# Patient Record
Sex: FEMALE | Race: WHITE | Employment: OTHER | URBAN - METROPOLITAN AREA
[De-identification: names, ages, dates, MRNs, and addresses within clinical notes are randomized per-mention and may not be internally consistent; named-entity substitution may affect disease eponyms.]

---

## 2021-02-02 ENCOUNTER — TRANSCRIBE ORDER (OUTPATIENT)
Dept: SCHEDULING | Age: 66
End: 2021-02-02

## 2021-02-02 DIAGNOSIS — S09.90XA HEAD INJURY: Primary | ICD-10-CM

## 2021-02-03 ENCOUNTER — HOSPITAL ENCOUNTER (OUTPATIENT)
Dept: CT IMAGING | Age: 66
Discharge: HOME | End: 2021-02-03
Payer: MEDICARE

## 2021-02-03 DIAGNOSIS — S09.90XA HEAD INJURY: ICD-10-CM

## 2021-02-03 PROCEDURE — 70450 CT HEAD/BRAIN W/O DYE: CPT

## 2023-01-19 ENCOUNTER — OFFICE VISIT (OUTPATIENT)
Dept: ORTHOPEDIC SURGERY | Age: 68
End: 2023-01-19
Payer: MEDICARE

## 2023-01-19 VITALS — HEIGHT: 66 IN | WEIGHT: 138 LBS | BODY MASS INDEX: 22.18 KG/M2

## 2023-01-19 DIAGNOSIS — M70.61 TROCHANTERIC BURSITIS OF BOTH HIPS: ICD-10-CM

## 2023-01-19 DIAGNOSIS — M25.552 BILATERAL HIP PAIN: Primary | ICD-10-CM

## 2023-01-19 DIAGNOSIS — M25.551 BILATERAL HIP PAIN: Primary | ICD-10-CM

## 2023-01-19 DIAGNOSIS — M70.62 TROCHANTERIC BURSITIS OF BOTH HIPS: ICD-10-CM

## 2023-01-19 RX ORDER — MULTIVITAMIN
1 TABLET ORAL DAILY
COMMUNITY

## 2023-01-19 NOTE — PROGRESS NOTES
Amanda Desouza (: 1955) is a 69 y.o. female patient, here for evaluation of the following chief complaint(s):  Hip Pain (Bilateral hip pain/)       ASSESSMENT/PLAN:  Below is the assessment and plan developed based on review of pertinent history, physical exam, labs, studies, and medications. 61-year-old female comes in today complaining of bilateral hip pain. The pain is laterally based. She has no groin or thigh pain. Is been going on for more than 6 months. She has tried oral anti-inflammatories which helped somewhat. She has pain at night when she sleeps on both sides. She says she has mild to moderate pain going up and on stairs and walking. She has mild pain rising from sitting. She wanted to discuss further management options. We are going to start physical therapy. We also discussed intermittent over-the-counter anti-inflammatories including risks and benefits. She can follow-up as needed. If it worsens we may consider an injection      1. Bilateral hip pain  -     XR HIPS BI W OR WO AP PELV; Future  2. Trochanteric bursitis of both hips      Encounter Diagnoses   Name Primary? Bilateral hip pain Yes    Trochanteric bursitis of both hips         No follow-ups on file. SUBJECTIVE/OBJECTIVE:  Amanda Desouza (: 1955) is a 69 y.o. female who presents today for the following:  Chief Complaint   Patient presents with    Hip Pain     Bilateral hip pain         61-year-old female comes in today complaining of bilateral hip pain. The pain is laterally based. She has no groin or thigh pain. Is been going on for more than 6 months. She has tried oral anti-inflammatories which helped somewhat. She has pain at night when she sleeps on both sides. She says she has mild to moderate pain going up and on stairs and walking. She has mild pain rising from sitting. IMAGING:  XR Results (most recent):  No results found for this or any previous visit.        No Known Allergies    Current Outpatient Medications   Medication Sig    multivitamin (ONE A DAY) tablet Take 1 Tablet by mouth daily. No current facility-administered medications for this visit. No past medical history on file. No past surgical history on file. No family history on file. Social History     Tobacco Use    Smoking status: Never    Smokeless tobacco: Never   Substance Use Topics    Alcohol use: Not Currently        All systems reviewed x 12 and were negative with the exception of None      No flowsheet data found. Vitals:  Ht 5' 6\" (1.676 m)   Wt 138 lb (62.6 kg)   BMI 22.27 kg/m²    Body mass index is 22.27 kg/m². Physical Exam    General: NAD, well developed, well nourished. Cardiac: Extremities well perfused. Respiratory: Nonlabored breathing. RLE: No antalgic gait. Tender over trochanteric bursa. Negative stinchfield. 0-100 degrees of flexion. >20 degrees internal rotation, >30 degrees external rotation. Negative LINDA. No pain with flexion adduction and internal rotation. .  Motor strength grossly intact. LLE: No antalgic gait. Tender over trochanteric bursa. Negative stinchfield. 0-100 degrees of flexion. >20 degrees internal rotation, >30 degrees external rotation. Negative LINDA. No pain with flexion adduction and internal rotation. .  Motor strength grossly intact. Skin: Warm well perfused. Vascular: Palpable pedal pulses bilaterally. Equal. Capillary refill less than 2 seconds. An electronic signature was used to authenticate this note.   -- Faye Phillips MD

## 2023-02-01 ENCOUNTER — OFFICE VISIT (OUTPATIENT)
Dept: ORTHOPEDIC SURGERY | Age: 68
End: 2023-02-01
Payer: MEDICARE

## 2023-02-01 DIAGNOSIS — M70.61 TROCHANTERIC BURSITIS OF BOTH HIPS: ICD-10-CM

## 2023-02-01 DIAGNOSIS — M25.551 BILATERAL HIP PAIN: Primary | ICD-10-CM

## 2023-02-01 DIAGNOSIS — M70.62 TROCHANTERIC BURSITIS OF BOTH HIPS: ICD-10-CM

## 2023-02-01 DIAGNOSIS — M25.552 BILATERAL HIP PAIN: Primary | ICD-10-CM

## 2023-02-01 NOTE — PROGRESS NOTES
Donna Zhang (: 1955) is a 69 y.o. female patient here for evaluation of the following chief complaint(s):  Hip Pain       Patient Name: Donna Zhang  Date:2023  : 1955  [x]  Patient  Verified  Payor: Renenirmal Chilo / Plan: [address] [place] [address] HMO / Product Type: Managed Care Medicare /    In time: 9:55  Out time: 10:45  Total Treatment Time (min): 50  Total Timed Codes (min): 40  1:1 Treatment Time ([facility] only): 40   Visit #:       SUBJECTIVE/OBJECTIVE:  HPI    The patient is a 69year old female with bilateral hip pain. The patient is referred by Erasmo Dixon MD.   The pain is laterally based. She has no groin or thigh pain. It has been bothering her on and off for 6-7 years but she has not done anything to address it. She has tried oral anti-inflammatories which helped somewhat. She has pain at night when she sleeps on both sides. She says she has mild to moderate pain with prolonged standing, walking and has mild pain rising from sitting. Long-term she would like to get back to distance walking, being able to sleep on either side, and developing a strength and flexibility routine to help her symptoms. She does have a home gym set up where she gets on her rowing machine, does some yoga and lifts free weights.     Physical Exam    Hip physical exam    Range of motion: on right  Flexion-  full  Extension-  not tested  IR/ER- measured in sitting; 45°/40°    Range of motion: on left  Flexion- full  Extension- not tested   IR/ER- measured in sitting; 45°/45°    Strength: MMT on right  Knee ext-  5/5  Knee flex- 5/5  Hip ER/IR-4+/5, no pain with resistance  Hip abd- 4+/5, no pain with resistance    Strength: MMT on left  Knee ext- 5/5  Knee flex- 5/5  Hip ER/IR- 4+/5, no pain with resistance  Hip abd- 4+/5, no pain with resistance    Flexibility:  bilateral  Hamstring- no deficit  Hip flexors- minimal deficit  Quadriceps- minimal deficit  LINDA- moderate deficit bilaterally    Soft tissue: restrictions in bilateral TFL, glute medius. Tenderness to palpation and overpressure along the lateral trochanters. Pain: Reported a visual analog scale:  0-5/10    Gait:  WFL    Squat: able, forward trunk lean    Balance: patient is able to perform a SL balance  for 15-20 seconds bilaterally prior to loss of balance    Special tests:  Scours, LINDA, FADIR, distraction- limited range of motion LINDA positioning    Lower Extremity Functional Scale  63\80      Therapeutic exercise performed:  LINDA stretch, crossover stretch, hip hikes, single-leg stance press outs, TFL/lateral hip release with lacrosse ball. 20 minutes. Patient finished with heat to bilateral hips for 10 minutes. An electronic signature was used to authenticate this note. -- Preethi Hayes PT       ASSESSMENT/PLAN:  Below is the assessment and plan developed based on review of pertinent history, physical exam, labs, studies, and medications. 1. Bilateral hip pain  2. Trochanteric bursitis of both hips      Return in about 1 week (around 2/8/2023). Jacqueline Michelle is a 69 y.o. F presenting with bilateral lateral hip pain, initial onset 6-7 years ago. She currently has the following physical therapy impairments: Increased pain with direct palpation of the lateral hip, lateral greater trochanter, loss of quadriceps, hamstring, hip flexor flexibility, loss of hip external rotation range of motion, pain/difficulty  with prolonged walking, squatting, and standing;  mild weakness of the hip abductors and external rotators; increased knee valgus with double and single leg squatting, and an inability to perform recreational activities including longer duration walking without pain. She would benefit from skilled physical therapy services in order to address the above impairments to allow for full functional return and return to recreation.  Focus initially will be given to address ROM and flexibility deficits. Will progress with strength as appropriate. Reviewed a home program with the patient. The patient appeared to understand the plan of care and was in agreement on her home program.  She will attend physical therapy once/week, for 1 month in order to address the above impairments. Goals to be met in 4-6 weeks-  1. Patient will improve LE flexibility (ER motion) to improve function with the above impairments  2. Patient will be able to walk for 60 minutes without bilateral lateral hip pain  3. Patient will be able to sleep through the night on either side without pain  4. The patient will be independent with a HEP  5. The patient will demonstrate full 5/5 strength of bilateral hip abductors and external rotators      An electronic signature was used to authenticate this note.   -- Sharlie Lesches, PT

## 2023-02-06 ENCOUNTER — OFFICE VISIT (OUTPATIENT)
Dept: ORTHOPEDIC SURGERY | Age: 68
End: 2023-02-06
Payer: MEDICARE

## 2023-02-06 DIAGNOSIS — M25.552 BILATERAL HIP PAIN: Primary | ICD-10-CM

## 2023-02-06 DIAGNOSIS — M70.62 TROCHANTERIC BURSITIS OF BOTH HIPS: ICD-10-CM

## 2023-02-06 DIAGNOSIS — M25.551 BILATERAL HIP PAIN: Primary | ICD-10-CM

## 2023-02-06 DIAGNOSIS — M70.61 TROCHANTERIC BURSITIS OF BOTH HIPS: ICD-10-CM

## 2023-02-06 PROCEDURE — 97110 THERAPEUTIC EXERCISES: CPT | Performed by: PHYSICAL THERAPIST

## 2023-02-06 PROCEDURE — 97140 MANUAL THERAPY 1/> REGIONS: CPT | Performed by: PHYSICAL THERAPIST

## 2023-02-06 NOTE — PROGRESS NOTES
Dorie Flowers (: 1955) is a 69 y.o. female patient here for evaluation of the following chief complaint(s):  Hip Pain       Patient Name: Dorie Flowers  Date:2023  : 1955  [x]  Patient  Verified  Payor: April Milan / Plan: [address] [place] [address] HMO / Product Type: Managed Care Medicare /    In time: 1:40  Out time: 2:45  Total Treatment Time (min): 65  Total Timed Codes (min): 50  1:1 Treatment Time ( [address] only): 50   Visit #:       ASSESSMENT/PLAN:  Below is the assessment and plan developed based on review of pertinent history, physical exam, labs, studies, and medications. 1. Bilateral hip pain  2. Trochanteric bursitis of both hips    Return in about 1 week (around 2023). The patient is making good progress towards long-term goals. She has been able to walk for longer durations and not experience lateral hip pain. We are continue to progress her hip and quad strengthening and balance routine in the clinic, she will follow-up next week and we will update her home program at that time. SUBJECTIVE/OBJECTIVE:  HPI    Patient reports she was able to walk 1 to 1-1/2 hours on both Saturday and . She did not have any soreness afterwards. Physical Exam    Objective:    ROM: full range, bilaterally. Mild stiffness into ER. Function/Strength: restrictions in bilateral TFL, glute medius. Mild tenderness to palpation and overpressure along the lateral trochanters. Manual performed: STM TFL, active release glute region, bilaterally. 20 min. Therapeutic exercise performed: LINDA stretch, crossover stretch, hip hikes, single-leg stance press outs, band walks, band squats, calf raises, TFL/lateral hip release with lacrosse ball. 30 minutes. An electronic signature was used to authenticate this note.   -- Dylan Sharif, PT

## 2023-02-15 ENCOUNTER — OFFICE VISIT (OUTPATIENT)
Dept: ORTHOPEDIC SURGERY | Age: 68
End: 2023-02-15
Payer: MEDICARE

## 2023-02-15 DIAGNOSIS — M70.61 TROCHANTERIC BURSITIS OF BOTH HIPS: ICD-10-CM

## 2023-02-15 DIAGNOSIS — M25.552 BILATERAL HIP PAIN: Primary | ICD-10-CM

## 2023-02-15 DIAGNOSIS — M25.551 BILATERAL HIP PAIN: Primary | ICD-10-CM

## 2023-02-15 DIAGNOSIS — M70.62 TROCHANTERIC BURSITIS OF BOTH HIPS: ICD-10-CM

## 2023-02-15 NOTE — PROGRESS NOTES
Reji Garcia (: 1955) is a 69 y.o. female patient here for evaluation of the following chief complaint(s):  Hip Pain       Patient Name: Reji Garcia  Date:2/15/2023  : 1955  [x]  Patient  Verified  Payor: Sonia Gilliam / Plan: [address] [place] [address] HMO / Product Type: Managed Care Medicare /    In time: 1:40  Out time: 2:45  Total Treatment Time (min): 65  Total Timed Codes (min): 50  1:1 Treatment Time ( [address] only): 50   Visit #: 3 of 25      ASSESSMENT/PLAN:  Below is the assessment and plan developed based on review of pertinent history, physical exam, labs, studies, and medications. 1. Bilateral hip pain  2. Trochanteric bursitis of both hips    Return in about 1 week (around 2023). The patient is making good progress towards long-term goals. She has been able to walk for longer durations and not experience lateral hip pain. We are continuing to progress her hip and quad strengthening and balance routine in the clinic, provided an updated program for her to work on a few days per week. We will continue in the clinic for another 1-2 sessions and will discuss a plan of care at that time. SUBJECTIVE/OBJECTIVE:  HPI    Patient reports she is doing very well, she is able to walk for longer distances without onset of lateral hip pain. Notes that she only woke up once during the night last night and is continuing to make progress in her ability to tolerate sleeping on either side. Physical Exam    Objective:    ROM: full range, bilaterally. Mild stiffness into ER. Function/Strength: restrictions in bilateral TFL, glute medius. Mild tenderness to palpation and overpressure along the lateral trochanters. Manual performed: STM TFL, active release glute region, bilaterally. 20 min.     Therapeutic exercise performed: LINDA stretch, hip hikes, single-leg stance press outs, band walks, band squats, sliders, TFL/lateral hip release with lacrosse ball, SL RDL, step ups.  40 minutes. An electronic signature was used to authenticate this note.   -- Jonny Parents, PT

## 2023-02-22 ENCOUNTER — OFFICE VISIT (OUTPATIENT)
Dept: ORTHOPEDIC SURGERY | Age: 68
End: 2023-02-22
Payer: MEDICARE

## 2023-02-22 DIAGNOSIS — M70.62 TROCHANTERIC BURSITIS OF BOTH HIPS: ICD-10-CM

## 2023-02-22 DIAGNOSIS — M70.61 TROCHANTERIC BURSITIS OF BOTH HIPS: ICD-10-CM

## 2023-02-22 DIAGNOSIS — M25.551 BILATERAL HIP PAIN: Primary | ICD-10-CM

## 2023-02-22 DIAGNOSIS — M25.552 BILATERAL HIP PAIN: Primary | ICD-10-CM

## 2023-02-22 PROCEDURE — 97110 THERAPEUTIC EXERCISES: CPT | Performed by: PHYSICAL THERAPIST

## 2023-02-22 PROCEDURE — 97140 MANUAL THERAPY 1/> REGIONS: CPT | Performed by: PHYSICAL THERAPIST

## 2023-02-22 NOTE — PROGRESS NOTES
Gilmer Mendieta (: 1955) is a 69 y.o. female patient here for evaluation of the following chief complaint(s):  Hip Pain       Patient Name: Gilmer Mendieta  MPCZ:[id number]  : 1955  [x]  Patient  Verified  Payor: Amanda Polanco / Plan: [address] [place] [address] HMO / Product Type: Managed Care Medicare /    In time: 2:00 Out time: 3:00  Total Treatment Time (min): 60  Total Timed Codes (min): 50  1:1 Treatment Time ( [address] only): 50   Visit #:       ASSESSMENT/PLAN:  Below is the assessment and plan developed based on review of pertinent history, physical exam, labs, studies, and medications. 1. Bilateral hip pain  2. Trochanteric bursitis of both hips    Return in about 1 week (around 3/1/2023). The patient was making good progress towards long-term goals, but had a recent setback. She may have overdone her strengthening exercises last week, she will take things easy the next few days and may resume some of her exercises at that time. We will continue in the clinic for another 1-2 sessions and will discuss a plan of care at that time. SUBJECTIVE/OBJECTIVE:  HPI    Patient reports she had a minor setback in the last week. She is now experiencing discomfort when sleeping on her right or left side. She is able to to walk for over an hour with minimal symptoms. Physical Exam    Objective:    ROM: full range, bilaterally. Mild stiffness into ER. Function/Strength: restrictions in bilateral TFL, glute medius. Mild tenderness to palpation and overpressure along the lateral trochanters. Full strength 5/5 side-lying hip abductor's and external rotators bilaterally    Manual performed: STM TFL, active release glute region, bilaterally. 20 min. Therapeutic exercise performed: LINDA stretch, hip hikes, single-leg stance press outs, band walks, band squats, sliders, TFL/lateral hip release with lacrosse ball, SL RDL, step ups. 40 minutes.         An electronic signature was used to authenticate this note.   -- Marylou Jaime, PT

## 2023-03-01 ENCOUNTER — OFFICE VISIT (OUTPATIENT)
Dept: ORTHOPEDIC SURGERY | Age: 68
End: 2023-03-01

## 2023-03-01 DIAGNOSIS — M25.552 BILATERAL HIP PAIN: Primary | ICD-10-CM

## 2023-03-01 DIAGNOSIS — M70.62 TROCHANTERIC BURSITIS OF BOTH HIPS: ICD-10-CM

## 2023-03-01 DIAGNOSIS — M70.61 TROCHANTERIC BURSITIS OF BOTH HIPS: ICD-10-CM

## 2023-03-01 DIAGNOSIS — M25.551 BILATERAL HIP PAIN: Primary | ICD-10-CM

## 2023-03-01 NOTE — PROGRESS NOTES
Jeanette Torres (: 1955) is a 69 y.o. female patient here for evaluation of the following chief complaint(s):  Hip Pain       Patient Name: Jeanette Torres  Date:3/1/2023  : 1955  [x]  Patient  Verified  Payor: Edwardcarlos Shelton / Plan: [address] [place] [address] HMO / Product Type: Managed Care Medicare /    In time: 2:00 Out time: 2:55  Total Treatment Time (min): 55  Total Timed Codes (min): 50  1:1 Treatment Time ( [address] only): 54  Visit #:       ASSESSMENT/PLAN:  Below is the assessment and plan developed based on review of pertinent history, physical exam, labs, studies, and medications. 1. Bilateral hip pain  2. Trochanteric bursitis of both hips    Return if symptoms worsen or fail to improve. The patient is great progress in her ability to sleep and walk for longer distances. She feels that she is back to where she was a few weeks ago. She feels comfortable with her home program and is ready to try exercises on her own, she will follow-up in the clinic as needed after a few weeks time otherwise will be discharged to a home program.     SUBJECTIVE/OBJECTIVE:  HPI    Patient reports she is back to where she was a few weeks ago. She is able to walk as much as she would like and has been able to sleep on either side without hip pain. Physical Exam    Objective:    ROM: full range, bilaterally. Mild stiffness into ER. Function/Strength: restrictions in bilateral TFL, glute medius. Mild tenderness to palpation and overpressure along the lateral trochanters. Full strength 5/5 side-lying hip abductor's and external rotators bilaterally    Manual performed: STM TFL, active release glute region, bilaterally. 20 min. Therapeutic exercise performed: LINDA stretch, hip hikes, single-leg stance press outs, band walks, band squats, sliders, TFL/lateral hip release with lacrosse ball, SL RDL, step ups. 35 minutes.         An electronic signature was used to authenticate this note.  -- August Peed, PT

## 2025-05-27 ENCOUNTER — HOSPITAL ENCOUNTER (OUTPATIENT)
Facility: HOSPITAL | Age: 70
Setting detail: OUTPATIENT SURGERY
Discharge: HOME | End: 2025-05-27
Attending: INTERNAL MEDICINE | Admitting: INTERNAL MEDICINE
Payer: MEDICARE

## 2025-05-27 ENCOUNTER — ANESTHESIA (OUTPATIENT)
Facility: HOSPITAL | Age: 70
End: 2025-05-27
Payer: MEDICARE

## 2025-05-27 ENCOUNTER — ANESTHESIA EVENT (OUTPATIENT)
Facility: HOSPITAL | Age: 70
End: 2025-05-27
Payer: MEDICARE

## 2025-05-27 VITALS
WEIGHT: 138 LBS | HEIGHT: 66 IN | DIASTOLIC BLOOD PRESSURE: 65 MMHG | TEMPERATURE: 98 F | SYSTOLIC BLOOD PRESSURE: 117 MMHG | HEART RATE: 63 BPM | OXYGEN SATURATION: 97 % | BODY MASS INDEX: 22.18 KG/M2 | RESPIRATION RATE: 13 BRPM

## 2025-05-27 PROCEDURE — 3600007512: Performed by: INTERNAL MEDICINE

## 2025-05-27 PROCEDURE — 7100000010 HC PHASE II RECOVERY - FIRST 15 MIN: Performed by: INTERNAL MEDICINE

## 2025-05-27 PROCEDURE — 7100000011 HC PHASE II RECOVERY - ADDTL 15 MIN: Performed by: INTERNAL MEDICINE

## 2025-05-27 PROCEDURE — 3700000000 HC ANESTHESIA ATTENDED CARE: Performed by: INTERNAL MEDICINE

## 2025-05-27 PROCEDURE — 3600007502: Performed by: INTERNAL MEDICINE

## 2025-05-27 PROCEDURE — 6360000002 HC RX W HCPCS: Performed by: NURSE ANESTHETIST, CERTIFIED REGISTERED

## 2025-05-27 PROCEDURE — 3700000001 HC ADD 15 MINUTES (ANESTHESIA): Performed by: INTERNAL MEDICINE

## 2025-05-27 PROCEDURE — 88305 TISSUE EXAM BY PATHOLOGIST: CPT

## 2025-05-27 PROCEDURE — 2580000003 HC RX 258: Performed by: NURSE ANESTHETIST, CERTIFIED REGISTERED

## 2025-05-27 PROCEDURE — 2709999900 HC NON-CHARGEABLE SUPPLY: Performed by: INTERNAL MEDICINE

## 2025-05-27 RX ORDER — MULTIVITAMIN
1 TABLET ORAL DAILY
COMMUNITY

## 2025-05-27 RX ORDER — SODIUM CHLORIDE 9 MG/ML
INJECTION, SOLUTION INTRAVENOUS PRN
Status: DISCONTINUED | OUTPATIENT
Start: 2025-05-27 | End: 2025-05-27 | Stop reason: HOSPADM

## 2025-05-27 RX ORDER — SODIUM CHLORIDE 9 MG/ML
INJECTION, SOLUTION INTRAVENOUS
Status: DISCONTINUED | OUTPATIENT
Start: 2025-05-27 | End: 2025-05-27 | Stop reason: SDUPTHER

## 2025-05-27 RX ADMIN — PROPOFOL 40 MG: 10 INJECTION, EMULSION INTRAVENOUS at 08:17

## 2025-05-27 RX ADMIN — PROPOFOL 30 MG: 10 INJECTION, EMULSION INTRAVENOUS at 08:08

## 2025-05-27 RX ADMIN — PROPOFOL 70 MG: 10 INJECTION, EMULSION INTRAVENOUS at 08:04

## 2025-05-27 RX ADMIN — PROPOFOL 40 MG: 10 INJECTION, EMULSION INTRAVENOUS at 08:11

## 2025-05-27 RX ADMIN — PROPOFOL 30 MG: 10 INJECTION, EMULSION INTRAVENOUS at 08:14

## 2025-05-27 RX ADMIN — PROPOFOL 40 MG: 10 INJECTION, EMULSION INTRAVENOUS at 08:20

## 2025-05-27 RX ADMIN — PROPOFOL 30 MG: 10 INJECTION, EMULSION INTRAVENOUS at 08:05

## 2025-05-27 RX ADMIN — SODIUM CHLORIDE: 9 INJECTION, SOLUTION INTRAVENOUS at 08:00

## 2025-05-27 NOTE — PROGRESS NOTES
Initial RN admission and assessment performed and documented in Endoscopy navigator.     Patient evaluated by anesthesia in pre-procedure holding.     All procedural vital signs, airway assessment, and level of consciousness information monitored and recorded by anesthesia staff on the anesthesia record.     Report received from CRNA post procedure.  Patient transported to recovery area by RN.    Endoscopy post procedure time out was performed and specimens were verified by physician.    Endoscope was pre-cleaned at bedside immediately following procedure by Viki TOBAR

## 2025-05-27 NOTE — DISCHARGE INSTRUCTIONS
[facility]  [facility]  Ricardo Dean MD  [phone]      May 27, 2025    Rani Schmidt  YOB: 1955    ENDOSCOPY DISCHARGE INSTRUCTIONS    If there is redness at IV site you should apply warm compress to area.  If redness or soreness persist contact your primary care doctor.    There may be a slight amount of blood passed from the rectum.  Gaseous discomfort may develop, but walking, belching will help relieve this.  You may not operate a vehicle for 12 hours  You may not operate machinery or dangerous appliances for rest of today  You may not drink alcoholic beverages for 12 hours  Avoid making any critical decisions for 24 hours    DIET:  You may resume your normal diet, but some patients find that heavy or large meals may lead to indigestion or vomiting.  I suggest a light meal as first food intake.    MEDICATIONS:  The use of some over-the-counter pain medication may lead to bleeding after colon biopsies or polyp removal.  Tylenol (also called acetaminophen) is safe to take even if you have had colonoscopy with polyp removal.  Based on the procedure you had today you may safely take aspirin or aspirin-like products for the next ten (10) days.  Remember that Tylenol (also called acetaminophen) is safe to take after colonoscopy even if you have had biopsies or polyps removed.    ACTIVITY:  You may resume your normal household activities, but it is recommended that you spend the remainder of the day resting -  avoid any strenuous activity.    CALL DR. DEAN'S OFFICE IF:  Increasing pain, nausea, vomiting  Abdominal distension (swelling)  Significant new or increased bleeding (oral or rectal)  Fever/Chills  Chest pain/shortness of breath                       Additional instructions:   Impression:  Internal hemorrhoids.  Colon polyp.  Diverticulosis.    Recommendations:   1.  Await pathology results.  2.  Patient can resume

## 2025-05-27 NOTE — H&P
Pre-Endoscopy H&P Update    Chief complaint/HPI/ROS:    The indication for the procedure, the patient's history and the patient's current medications are reviewed prior to the procedure and that data is reported on the H&P to which this document is attached.  Any significant complaints with regard to organ systems will be noted, and if not mentioned then a review of systems is not contributory.    History reviewed. No pertinent past medical history.   Past Surgical History:   Procedure Laterality Date    BREAST BIOPSY      Benign    CHOLECYSTECTOMY      HYSTERECTOMY (CERVIX STATUS UNKNOWN)      KNEE CARTILAGE SURGERY Right      Social   Social History     Tobacco Use    Smoking status: Never    Smokeless tobacco: Never   Substance Use Topics    Alcohol use: Yes     Alcohol/week: 3.0 standard drinks of alcohol     Types: 3 Glasses of wine per week      History reviewed. No pertinent family history.   No Known Allergies   Prior to Admission Medications   Prescriptions Last Dose Informant Patient Reported? Taking?   Multiple Vitamin (DAILY VITES) TABS Past Week  Yes Yes   Sig: Take 1 tablet by mouth daily      Facility-Administered Medications: None       PHYSICAL EXAM:  The patient is examined immediately prior to the procedure.    Vitals:    05/27/25 0750   BP: (!) 154/62   Pulse: 63   Resp: 18   Temp: 98 °F (36.7 °C)   SpO2: 97%       Gen: Appears comfortable, no distress.  Pulm: comfortable respirations with no abnormal audible breath sounds  HEART: well perfused, no abnormal audible heart sounds  GI: abdomen flat.    PLAN:  Informed consent discussion held, patient afforded an opportunity to ask questions and all questions answered.  After being advised of the risks, benefits, and alternatives, the patient requested that we proceed and indicated so on a written consent form.      Will proceed with procedure as planned.    Ricardo Dean MD  5/27/2025

## 2025-05-27 NOTE — OP NOTE
[facility]  [facility]  Ricardo Dean MD  [phone]      May 27, 2025    Colonoscopy Procedure Note  Rani Schmidt  :  1955  David Medical Record Number: [id number]    Indications:   Screening colonoscopy.  PCP:  No primary care provider on file.  Anesthesia/Sedation: Conscious Sedation/Moderate Sedation/GETA, see notes  Endoscopist:  Dr. Ricardo Dean  Complications:  None  Estimated Blood Loss:  None    Permit:  The indications, risks, benefits and alternatives were reviewed with the patient or their decision maker who was provided an opportunity to ask questions and all questions were answered.  The specific risks of colonoscopy with conscious sedation were reviewed, including but not limited to anesthetic complication, bleeding, adverse drug reaction, missed lesion, infection, IV site reactions, and intestinal perforation which would lead to the need for surgical repair.  Alternatives to colonoscopy including radiographic imaging, observation without testing, or laboratory testing were reviewed including the limitations of those alternatives.  After considering the options and having all their questions answered, the patient or their decision maker provided both verbal and written consent to proceed.        Procedure in Detail:  After obtaining informed consent, positioning of the patient in the left lateral decubitus position, and conduction of a pre-procedure pause or \"time out\" the endoscope was introduced into the anus and advanced to the cecum, which was identified by the ileocecal valve and appendiceal orifice.  The quality of the colonic preparation was adequate.  A careful inspection was made as the colonoscope was withdrawn, findings and interventions are described below.    Findings:   JACK: Normal.  Rectum: Grade 1 internal hemorrhoids, seen on retroflexed views.  Sigmoid colon: Normal.  Descending colon:

## 2025-05-27 NOTE — ANESTHESIA PRE PROCEDURE
Department of Anesthesiology  Preprocedure Note       Name:  Rani Schmidt   Age:  69 y.o.  :  1955                                          MRN:  [id number]         Date:  2025      Surgeon: Surgeon(s):  Ricardo Dean MD    Procedure: Procedure(s):  COLORECTAL CANCER SCREENING, NOT HIGH RISK    Medications prior to admission:   Prior to Admission medications    Medication Sig Start Date End Date Taking? Authorizing Provider   Multiple Vitamin (DAILY VITES) TABS Take 1 tablet by mouth daily   Yes Provider, MD Jovanni       Current medications:    No current facility-administered medications for this encounter.       Allergies:  No Known Allergies    Problem List:  There is no problem list on file for this patient.      Past Medical History:  History reviewed. No pertinent past medical history.    Past Surgical History:        Procedure Laterality Date   • BREAST BIOPSY      Benign   • CHOLECYSTECTOMY     • HYSTERECTOMY (CERVIX STATUS UNKNOWN)     • KNEE CARTILAGE SURGERY Right        Social History:    Social History     Tobacco Use   • Smoking status: Never   • Smokeless tobacco: Never   Substance Use Topics   • Alcohol use: Yes     Alcohol/week: 3.0 standard drinks of alcohol     Types: 3 Glasses of wine per week                                Counseling given: Not Answered      Vital Signs (Current): There were no vitals filed for this visit.                                           BP Readings from Last 3 Encounters:   No data found for BP       NPO Status: Time of last liquid consumption: 0600 (sips of water)                        Time of last solid consumption: 1800                        Date of last liquid consumption: 25                        Date of last solid food consumption: 25    BMI:   Wt Readings from Last 3 Encounters:   23 62.6 kg (138 lb)     There is no height or weight on file to calculate BMI.    CBC: No results found for: \"WBC\", \"RBC\", \"HGB\", \"HCT\",

## 2025-05-27 NOTE — ANESTHESIA POSTPROCEDURE EVALUATION
Department of Anesthesiology  Postprocedure Note    Patient: Rani Schmidt  MRN: [id number]  YOB: 1955  Date of evaluation: 5/27/2025    Procedure Summary       Date: 05/27/25 Room / Location: [facility] 04 / [facility] ENDOSCOPY    Anesthesia Start: 0800 Anesthesia Stop: 0824    Procedure: COLORECTAL CANCER SCREENING, NOT HIGH RISK (Lower GI Region) Diagnosis:       Screen for colon cancer      (Screen for colon cancer [Z12.11])    Surgeons: Ricardo Dean MD Responsible Provider: Ty Noonan MD    Anesthesia Type: MAC ASA Status: 2            Anesthesia Type: MAC    Jas Phase I: Jas Score: 10    Jas Phase II: Jas Score: 10    Anesthesia Post Evaluation    Patient location during evaluation: bedside  Nausea & Vomiting: no nausea  Cardiovascular status: blood pressure returned to baseline  Respiratory status: acceptable  Hydration status: euvolemic    No notable events documented.

## (undated) DEVICE — TRAP SURG QUAD PARABOLA SLOT DSGN SFTY SCRN TRAPEASE

## (undated) DEVICE — SINGLE-USE POLYPECTOMY SNARE: Brand: CAPTIVATOR

## (undated) DEVICE — FORCEPS BX L240CM JAW DIA2.8MM L CAP W/ NDL MIC MESH TOOTH

## (undated) DEVICE — SUPPLEMENT DIGESTIVE H2O SOL GI-EASE